# Patient Record
Sex: MALE | Race: WHITE | NOT HISPANIC OR LATINO | Employment: FULL TIME | URBAN - METROPOLITAN AREA
[De-identification: names, ages, dates, MRNs, and addresses within clinical notes are randomized per-mention and may not be internally consistent; named-entity substitution may affect disease eponyms.]

---

## 2018-08-03 ENCOUNTER — HOSPITAL ENCOUNTER (OUTPATIENT)
Dept: NEUROLOGY | Facility: CLINIC | Age: 28
Discharge: HOME | End: 2018-08-03
Attending: EMERGENCY MEDICINE

## 2018-08-03 DIAGNOSIS — R56.9 SEIZURE (H): ICD-10-CM

## 2018-08-08 ENCOUNTER — HOSPITAL ENCOUNTER (OUTPATIENT)
Dept: MRI IMAGING | Facility: CLINIC | Age: 28
Discharge: HOME | End: 2018-08-08
Attending: EMERGENCY MEDICINE

## 2018-08-08 DIAGNOSIS — G40.909 SEIZURE DISORDER (H): ICD-10-CM

## 2021-06-19 NOTE — PROGRESS NOTES
Bedside EEG was performed that included photic stimulation and 3 minutes of hyperventilation. The patient was both awake and asleep during the recording.  EEG System [id number] was used for this recording.  There was the appearance of an irregular ECG and Dr Luis was messaged about this.

## 2021-06-26 ENCOUNTER — HEALTH MAINTENANCE LETTER (OUTPATIENT)
Age: 31
End: 2021-06-26

## 2021-10-16 ENCOUNTER — HEALTH MAINTENANCE LETTER (OUTPATIENT)
Age: 31
End: 2021-10-16

## 2022-07-17 ENCOUNTER — HEALTH MAINTENANCE LETTER (OUTPATIENT)
Age: 32
End: 2022-07-17

## 2022-09-25 ENCOUNTER — HEALTH MAINTENANCE LETTER (OUTPATIENT)
Age: 32
End: 2022-09-25

## 2023-01-17 ENCOUNTER — VIRTUAL VISIT (OUTPATIENT)
Dept: FAMILY MEDICINE | Facility: CLINIC | Age: 33
End: 2023-01-17
Payer: COMMERCIAL

## 2023-01-17 DIAGNOSIS — H57.9 ITCHY EYES: Primary | ICD-10-CM

## 2023-01-17 PROCEDURE — 99203 OFFICE O/P NEW LOW 30 MIN: CPT | Mod: 95 | Performed by: NURSE PRACTITIONER

## 2023-01-17 RX ORDER — OLOPATADINE HYDROCHLORIDE 2 MG/ML
1 SOLUTION/ DROPS OPHTHALMIC DAILY
Qty: 2.5 ML | Refills: 0 | Status: SHIPPED | OUTPATIENT
Start: 2023-01-17

## 2023-01-17 NOTE — PROGRESS NOTES
Tacos is a 32 year old who is being evaluated via a billable video visit.      How would you like to obtain your AVS? MyChart  If the video visit is dropped, the invitation should be resent by: Text to cell phone: [phone]  Will anyone else be joining your video visit? No          Assessment & Plan     Itchy eyes  Likely either allergic or viral conjunctivitis.  Discussed using a histamine blocker drop and he can also use lubricating drop like Genteal or Refresh.  Also recommended ibuprofen.  Avoid touching eyes and use good hand hygiene.  If symptoms worsen or persist, should come in to clinic for evaluation to rule out corneal abrasion or injury.     - olopatadine (PATADAY) 0.2 % ophthalmic solution; Place 0.05 mLs (1 drop) into both eyes daily           Return for Follow up if symptoms worsen or fail to improve.    [facility]    Dawit Balderrama is a 32 year old, presenting for the following health issues:  No chief complaint on file.      History of Present Illness       Reason for visit:  Eye itch  Symptom onset:  1-3 days ago  Symptoms include:  Eye itch  Symptom intensity:  Moderate  Symptom progression:  Worsening  Had these symptoms before:  No        Started last night, used eye drops to rinse it.  Right eye is crusty and itchy.  Left a little bit itchy.  Wears glasses for driving.  Does not think it was scratched.  Not having any eye pain or discharge during the day.     Review of Systems   Constitutional, HEENT, cardiovascular, pulmonary, gi and gu systems are negative, except as otherwise noted.      Objective           Vitals:  No vitals were obtained today due to virtual visit.    Physical Exam   GENERAL: Healthy, alert and no distress  EYES: Eyes grossly normal to inspection.  No discharge or erythema, or obvious scleral/conjunctival abnormalities.  RESP: No audible wheeze, cough, or visible cyanosis.  No visible retractions or increased work  of breathing.    SKIN: Visible skin clear. No significant rash, abnormal pigmentation or lesions.  NEURO: Cranial nerves grossly intact.  Mentation and speech appropriate for age.  PSYCH: Mentation appears normal, affect normal/bright, judgement and insight intact, normal speech and appearance well-groomed.                Video-Visit Details    Type of service:  Video Visit     Originating Location (pt. Location): Home    Distant Location (provider location):  Off-site  Platform used for Video Visit: Liban

## 2023-08-05 ENCOUNTER — HEALTH MAINTENANCE LETTER (OUTPATIENT)
Age: 33
End: 2023-08-05

## 2024-09-28 ENCOUNTER — HEALTH MAINTENANCE LETTER (OUTPATIENT)
Age: 34
End: 2024-09-28